# Patient Record
(demographics unavailable — no encounter records)

---

## 2024-11-27 NOTE — PLAN
[FreeTextEntry1] : contraception options discussed. pt requesting mirena. r/b/a advised. cultures and blood work discussed

## 2024-11-27 NOTE — PHYSICAL EXAM
[Chaperone Present] : A chaperone was present in the examining room during all aspects of the physical examination [[id number]] : A chaperone was present during the pelvic exam. [Appropriately responsive] : appropriately responsive [Alert] : alert [No Acute Distress] : no acute distress [No Lymphadenopathy] : no lymphadenopathy [Regular Rate Rhythm] : regular rate rhythm [No Murmurs] : no murmurs [Clear to Auscultation B/L] : clear to auscultation bilaterally [Soft] : soft [Non-tender] : non-tender [Non-distended] : non-distended [No HSM] : No HSM [No Lesions] : no lesions [No Mass] : no mass [Oriented x3] : oriented x3 [Labia Majora] : normal [Labia Minora] : normal [Normal] : normal [Uterine Adnexae] : normal

## 2025-03-26 NOTE — PLAN
[FreeTextEntry1] : findings discussed. rx to pharmacy  wet mount +bv/-yeast,trichomonas. vagina copiously irrigated

## 2025-03-26 NOTE — PHYSICAL EXAM
[Chaperone Present] : A chaperone was present in the examining room during all aspects of the physical examination [Appropriately responsive] : appropriately responsive [Alert] : alert [No Acute Distress] : no acute distress [No Lymphadenopathy] : no lymphadenopathy [Regular Rate Rhythm] : regular rate rhythm [No Murmurs] : no murmurs [Clear to Auscultation B/L] : clear to auscultation bilaterally [Soft] : soft [Non-tender] : non-tender [Non-distended] : non-distended [No HSM] : No HSM [No Lesions] : no lesions [No Mass] : no mass [Oriented x3] : oriented x3 [Labia Majora] : normal [Labia Minora] : normal [Discharge] : a  ~M vaginal discharge was present [Scant] : scant [White] : white [Thin] : thin [Normal] : normal [Uterine Adnexae] : normal

## 2025-06-11 NOTE — PHYSICAL EXAM
[Chaperoned Physical Exam] : A chaperone was present in the examining room during all aspects of the physical examination. [MA] : MA [FreeTextEntry2] : kk [Alert] : alert [Appropriately responsive] : appropriately responsive [No Acute Distress] : no acute distress [No Lymphadenopathy] : no lymphadenopathy [Regular Rate Rhythm] : regular rate rhythm [No Murmurs] : no murmurs [Clear to Auscultation B/L] : clear to auscultation bilaterally [Soft] : soft [Non-distended] : non-distended [Non-tender] : non-tender [No HSM] : No HSM [No Mass] : no mass [No Lesions] : no lesions [Oriented x3] : oriented x3 [Labia Majora] : normal [Labia Minora] : normal [Discharge] : discharge [Heavy] : heavy [White] : white [Cheesy] : cheesy [Normal] : normal [Uterine Adnexae] : normal

## 2025-06-11 NOTE — PLAN
[FreeTextEntry1] : findings discussed. culture to lab. rx to pharmacy  wet [facility] +bv,yeast/-trichomonas. vagina copiously irrigated

## 2025-07-01 NOTE — PLAN
[FreeTextEntry1] : pregnancy precautions reviewed. sono discussed. advised pnv. unsure is she wants to continue pregnancy. pt reassured and to call with decision. rx to pharmacy. f/u 4 weeks for nob is she is going to continue  [facility] viable iup, 6.4 weeks, +fh

## 2025-07-16 NOTE — HISTORY OF PRESENT ILLNESS
[FreeTextEntry1] : Referred by Dr. Buck  25 yo  at 8w5d presenting requesting .  I have independently reviewed and interpreted ultrasound performed on 25 This ultrasound places the pregnancy at 8w5d today by first trimester sonogram inconsistent with LMP 25. Pt took medication  in the past. She has decided on this method for today. Advised that the pregnancy is larger and to expect more bleeding and cramping.  Lara has been discussing a hormonal IUD with Dr. Buck. We discussed 52mg lng vs. Kyleena. She  prefers to see her period and is aware this may not happen with either method.  All: naproxen- rash Meds: methimazole (does not take daily) Obhx: NSVDx1, med abx1- uncomplicated Gynhx: denies PMH/PSH: toe surgery, and hyperthyroidism SH: social etoh  Medication    Patient denies medical history of: Liver disease, Renal failure, Chronic adrenal failure, Long term systemic corticosteroid use, IUD in place, Anticoagulant use of hemorrhagic disorder, Inherited porphyrias, Anemia, Allergy to mifepristone, misoprostol or other prostaglandins     Options for the pregnancy were discussed with the patient, including continuation of pregnancy, medical , dilation and curettage (D&C) in the office under local anesthesia or in the operating room under sedation. They do not desire to continue the pregnancy and are requesting medication . They understand that 2-7% of people who take medication  will need more medication or a surgical procedure to empty the uterus. They understand that medication  is not reversible. Common side effects and additional risks below reviewed.   Common side effects: cramping, bleeding, low grade fever, diarrhea, nausea, vomiting, headache, dizziness, back pain, feeling tired   The risks of medication  including: - Continuing pregnancy, pregnancy tissue or blood clots in the uterus and the need for further medication or surgery - Incomplete  causing heavy bleeding, infection, or both (which may require other testing or treatments such as further medication or surgery) - Bleeding too much or too long which may require further treatment with medication or surgery, or a blood transfusion - Infection in the uterus, which may require further treatment with medication, surgery or antibiotics.  It may also require hospital admission - Allergic reaction to any or all of the medications used    [facility] patient agreement reviewed and signed. A copy was given to the patient, along with the user guide.   1. Patient agrees to undergo surgical  if medication  fails.  2. The patient states they have access to a phone and a nearby hospital if the need for emergency services arises.  3. The patient states they have someone to be available to them if needed at time of medication . 4. The patient is willing and able to follow up to confirm the pregnancy was successfully terminated.   The patient was thoroughly counseled on instructions for medical  and the warning signs of any problems.  They voiced understanding of these warning signs and when to call and were provided with 24-hour contact information for on-call and available physicians. The patient also understands it is their responsibility to bring to the attention of their physician any unusual symptoms following the procedure and to report to follow-up phone calls and/or examinations.   They understand the need to call the office if they have no bleeding in 24 hours after misoprostol as this could mean either the medical  did not work, or something such as an ectopic pregnancy occurred.   The patient is sure of their decision and denies any coercion from family, friends or healthcare providers. The patient had the opportunity to ask questions and all questions were answered.

## 2025-07-16 NOTE — PLAN
[FreeTextEntry1] : 25 yo at 8w5d for medication  s/p mifepristone, plans for misoprostol tomorrow.  1. Medication  - All consents signed today, all questions/concerns addressed - Patient offered pamphlet for support services   2. Scheduling: Mife/miso  and  - mifepristone consents signed, mifepristone pamphlet given - Mifepristone 200 mg administered, lot #: [id number] - misoprostol 800mcg rx sent   3. ID/Neuro -GC/CT NEg 2025 -ibuprofen 600 mg q6 hours Rx sent -Zofran Rx sent -Reviewed Tylenol 975mg or 1000mg q6 hours     4. Labs/Blood type - CBC no history of anemia/WNL   5. Contraception - Patient interested in hormonal IUD, advised to wait until f/u    6. Post op - Pt given option for 2 week in-office follow up vs. 1 week phone call with office and home pregnancy test in 4 weeks - pt desires: in person - Medical  instruction and information packet given, reviewed bleeding and infection precautions.  24 hour contact information reviewed and provided - all questions/concerns addressed

## 2025-07-30 NOTE — PROCEDURE
[Transvaginal OB Sonogram] : Transvaginal OB Sonogram [Intrauterine Pregnancy] : no intrauterine pregnancy [FreeTextEntry1] : Endoemtrium up to 1.88cm with + flow on color doppler No gestational sac Completed medication  with concern for retained poc

## 2025-07-30 NOTE — HISTORY OF PRESENT ILLNESS
[FreeTextEntry1] : 25 yo P1 s/p medication  presenting for follow up. Med ab 2 weeks ago. Reports some continued spotting Reviewed US images, retained poc with lining up to 1.88cm. Advised that we can continue with IUD today or wait for her period for her body to flush out the products. She would like to proceed with iud insertion with understanding that it may be displaced in the future and need for removal and reinsertion Pt wants Kyleena. Prefers not to have amenorrhea but understands this may happen.  She has since broken up with her partner and feels that her 19 month old son is what is preventing her from slipping into depression. support resources offered.  LNG-IUS insertion Hcg: negative  The patient was counseled on the risks, benefits and alternatives to the LNG-IUS.  The patient understand the risks of infection, bleeding and trauma (specifically to the vagina, cervix, uterus, ovaries, fallopian tubes, bowel, bladder) and the small chance of IUD embedment, incorrect placement, incomplete removal and uterine perforation.  They voiced understanding that if uterine perforation, embedment or incomplete removal occurs that laparoscopy or hysteroscopy might be necessary to remove the IUD, and may or may not be successful in IUD removal.   The patient was counseled on potential side effects including changes in bleeding, specifically irregular bleeding in the first 3-6 months followed by increased likelihood of amenorrhea.  They were counseled on the increased risk of infection in the 3 weeks following insertion, the risk of pregnancy, and of ectopic pregnancy (and the need to see a physician immediately if she experiences signs/symptoms of pregnancy), and the risk of expulsion.  Reviewed 7 days efficacy for contraception. Recommend nothing per vagina x 1 day.They understand the need to follow up for a string check if any problems.  All questions were answered.    Pelvic Exam: Uterus: anteverted  Pre-operative time out performed.  Patients name, date of birth and procedure confirmed.  Speculum placed. 10cc 1% lidocaine paracervical block administered.  A tenaculum was placed on the anterior lip of the cervix.  The uterus was sounded to 9 cm. A LNG-IUS was brought onto the sterile field and inserted per protocol to the uterine fundus under direct ultrasound guidance in the usual fashion. Strings were trimmed to 2-3 cm.   Excellent hemostasis was noted.   The patient tolerated the procedure well. EBL: minimal  Kyleena IUD provided by my office. See order for details

## 2025-07-30 NOTE — PLAN
[FreeTextEntry1] : 23 yo s/p med ab with some retained poc, advised this will likely come out with her menses. Shared decision making, Lara chose to proceed with IUD placement today Return for string check 4-6 weeks with understanding that IUD may displace with tissue expulsion Will check in on mental health at next visit given recent break up   I spent a total of 15 minutes on the encounter evaluating and treating the patient.  Total time does not include the time spent on separately billable procedures performed on this DOS.

## 2025-07-30 NOTE — PHYSICAL EXAM
[Chaperoned Physical Exam] : A chaperone was present in the examining room during all aspects of the physical examination. [FreeTextEntry2] : NS[facility]A LPN